# Patient Record
(demographics unavailable — no encounter records)

---

## 2024-10-08 NOTE — PROCEDURE
[FreeTextEntry3] : Date of Service: 10/08/2024   Account: [id number]   Patient: SAMI LAKHANI   YOB: 1960   Age: 63 year     Surgeon:                                               Joy Ellsworth M.D.   Pre-Operative Diagnosis:                   Lumbosacral Radiculitis (M54.17)       Post Operative Diagnosis:                 Lumbosacral Radiculitis (M54.17)       Procedure:                                           Interlaminar lumbar epidural steroid injection (L5-S1) under fluoroscopic guidance   Anesthesia:                None     This procedure was carried out using fluoroscopic guidance.  The risks and benefits of the procedure were discussed extensively with the patient.  The consent of the patient was obtained and the following procedure was performed.   The patient was placed in the prone position.  The lumbar area was prepped and draped in a sterile fashion.  Under AP view with slight cephalad-caudad angulation, the L5-S1 interspace was identified and marked.  Using sterile technique the superficial skin was anesthetized with 1% Lidocaine without epinephrine.  A 20 gauge Tuohoy needle was advanced into the epidural space under fluoroscopy using [id number] technique and using loss of resistance at the L5-S1 level.  After negative aspiration for heme or CSF, an epidurogram was obtained using 3 cc Omnipaque contrast confirming epidural placement of the needle.    Epidurogram showed no evidence of intrathecal or intravascular flow, and good evidence of bilateral epidural flow from L3-S2 levels.  After this, 5 cc of preservative free normal saline and 80 mg of Kenalog were injected into the epidural space.   The needle was subsequently removed.     The patient tolerated the procedure well and was instructed to contact me immediately if there were any problems.   Joy Ellsworth M.D.

## 2024-11-05 NOTE — ASSESSMENT
[FreeTextEntry1] : After discussing various treatment options with the patient including but not limited to oral medications, physical therapy, exercise, modalities as well as interventional spinal injections, we have decided with the following plan:  1) Intervention Injection Therapy: I personally reviewed the MRI/CT scan images and agree with the radiologist's report. The radiological findings were discussed with the patient. The risks, benefits, contents and alternatives to injection were explained in full to the patient. Risks outlined include but are not limited to infection,sepsis, bleeding, post-dural puncture headache, nerve damage, temporary increase in pain, syncopal episode, failure to resolve symptoms, allergic reaction, symptom recurrence, and elevation of blood sugar in diabetics. Cortisone may cause immunosuppression. Patient understands the risks. All questions were answered. After discussion of options, patient requested an injection. Information regarding the injection was given to the patient. Which medications to stop prior to the injection was explained to the patient as well.  Follow up in 1-2 weeks post injection for re-evaluation.  Continue Home exercises, stretching, activity modification, physical therapy, and conservative care.  Patient is presenting with acute/sub-acute radicular pain with impairment in ADLs and functionality.  The pain has not responded sufficiently to  conservative care including nsaid therapy and/or physical therapy.  There is no bleeding tendency, unstable medical condition, or systemic infection. The purpose of the spinal injections is to facilitate active therapy by providing short term relief through reduction of pain and inflammation.   Injections, by themselves, are not likely to provide long-term relief. Rather, active rehabilitation with modified work achieves long-term relief by increasing active ROM, strength and stability.   Will get new MRI

## 2024-11-05 NOTE — HISTORY OF PRESENT ILLNESS
[Lower back] : lower back [3] : 3 [0] : 0 [Dull/Aching] : dull/aching [Radiating] : radiating [Tightness] : tightness [Intermittent] : intermittent [Rest] : rest [Injection therapy] : injection therapy [FreeTextEntry1] : 11/05/2024: follow up today for LESI L5/S1 on 10/8 with 3% relief.  09/25/2024: follow up today for low back pain.  Would like to schedule LESI.  Not on ASA  6/20/24- fu for LESI L5/S1 on 6/6/24 with 60% relief so far. Pain intensity has improved. This is the most effective shot to date. Still with a dull ache in the lower back.   05/21/2024: follow up today for LESI L5/S1 on 5/7 with 20% cumulative relief from the last.  pain in the lower back without radiation.     01/08/2024: follow up today after LESI L5-S1 on 12/18/23 with 50% relief.  Hold off on repeat injection  12/07/2023: follow up today. He was doing well until a couple of weeks ago. Same pain as in the past. Pain is across the lower back.   06/22/2023- Had 80% relief from injection.  PP FU S/P L5-S1 LESI ON 06/12/23  80% relief   (11/16/22, 5/3/23) PT OF    MRI (2/4/22) [facility]: 1.  Multilevel degenerative disc disease of the lumbar spine, 2.  L1-L2: Mild central canal stenosis and mild narrowing of the right subarticular zone. Mild left neural foraminal stenosis. 3.  L2-L3: Mild central canal stenosis with impingement upon the descending right L3 nerve root. Mild left neural foraminal stenosis. 4.  L3-L4: Disc bulge/central disc extrusion causes mild to moderate central canal stenosis. Mild to moderate left neural foraminal stenosis. 5.  L4-L5: Mild bilateral neuroforaminal stenosis. [] : no [FreeTextEntry7] : across the back [de-identified] : ELDON

## 2024-11-22 NOTE — HISTORY OF PRESENT ILLNESS
[Lower back] : lower back [3] : 3 [0] : 0 [Dull/Aching] : dull/aching [Radiating] : radiating [Tightness] : tightness [Intermittent] : intermittent [Rest] : rest [Injection therapy] : injection therapy [FreeTextEntry1] : 11/22/2024: follow up today for MRI review. 11/11/24: [facility]:  multilevel DDD spinal canal stenosis at L3/4. unchanged. L3/4: extrusion with mild stenosis, mild facet arthrosis. L4/5: left greater than right NF narrowing. L5/S1 disc bulge in close proximity to the b/l S1 nerve roots.   Pain only in the axial back without radiation.   11/05/2024: follow up today for LESI L5/S1 on 10/8 with 3% relief.  09/25/2024: follow up today for low back pain.  Would like to schedule LESI.  Not on ASA  6/20/24- fu for LESI L5/S1 on 6/6/24 with 60% relief so far. Pain intensity has improved. This is the most effective shot to date. Still with a dull ache in the lower back.   05/21/2024: follow up today for LESI L5/S1 on 5/7 with 20% cumulative relief from the last.  pain in the lower back without radiation.     01/08/2024: follow up today after LESI L5-S1 on 12/18/23 with 50% relief.  Hold off on repeat injection  12/07/2023: follow up today. He was doing well until a couple of weeks ago. Same pain as in the past. Pain is across the lower back.   06/22/2023- Had 80% relief from injection.  PP FU S/P L5-S1 LESI ON 06/12/23  80% relief   (11/16/22, 5/3/23) PT OF    MRI (2/4/22) [facility]: 1.  Multilevel degenerative disc disease of the lumbar spine, 2.  L1-L2: Mild central canal stenosis and mild narrowing of the right subarticular zone. Mild left neural foraminal stenosis. 3.  L2-L3: Mild central canal stenosis with impingement upon the descending right L3 nerve root. Mild left neural foraminal stenosis. 4.  L3-L4: Disc bulge/central disc extrusion causes mild to moderate central canal stenosis. Mild to moderate left neural foraminal stenosis. 5.  L4-L5: Mild bilateral neuroforaminal stenosis. [] : no [FreeTextEntry7] : across the back [de-identified] : ELDON

## 2024-11-22 NOTE — ASSESSMENT

## 2024-11-22 NOTE — HISTORY OF PRESENT ILLNESS
[Lower back] : lower back [3] : 3 [0] : 0 [Dull/Aching] : dull/aching [Radiating] : radiating [Tightness] : tightness [Intermittent] : intermittent [Rest] : rest [Injection therapy] : injection therapy [FreeTextEntry1] : 11/22/2024: follow up today for MRI review. 11/11/24: [facility]:  multilevel DDD spinal canal stenosis at L3/4. unchanged. L3/4: extrusion with mild stenosis, mild facet arthrosis. L4/5: left greater than right NF narrowing. L5/S1 disc bulge in close proximity to the b/l S1 nerve roots.   Pain only in the axial back without radiation.   11/05/2024: follow up today for LESI L5/S1 on 10/8 with 3% relief.  09/25/2024: follow up today for low back pain.  Would like to schedule LESI.  Not on ASA  6/20/24- fu for LESI L5/S1 on 6/6/24 with 60% relief so far. Pain intensity has improved. This is the most effective shot to date. Still with a dull ache in the lower back.   05/21/2024: follow up today for LESI L5/S1 on 5/7 with 20% cumulative relief from the last.  pain in the lower back without radiation.     01/08/2024: follow up today after LESI L5-S1 on 12/18/23 with 50% relief.  Hold off on repeat injection  12/07/2023: follow up today. He was doing well until a couple of weeks ago. Same pain as in the past. Pain is across the lower back.   06/22/2023- Had 80% relief from injection.  PP FU S/P L5-S1 LESI ON 06/12/23  80% relief   (11/16/22, 5/3/23) PT OF    MRI (2/4/22) [facility]: 1.  Multilevel degenerative disc disease of the lumbar spine, 2.  L1-L2: Mild central canal stenosis and mild narrowing of the right subarticular zone. Mild left neural foraminal stenosis. 3.  L2-L3: Mild central canal stenosis with impingement upon the descending right L3 nerve root. Mild left neural foraminal stenosis. 4.  L3-L4: Disc bulge/central disc extrusion causes mild to moderate central canal stenosis. Mild to moderate left neural foraminal stenosis. 5.  L4-L5: Mild bilateral neuroforaminal stenosis. [] : no [FreeTextEntry7] : across the back [de-identified] : ELDON

## 2024-12-05 NOTE — IMAGING
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 No significant abnormalities are seen] : There are no fractures, subluxations or dislocations. No significant abnormalities are seen [de-identified] :   ----------------------------------------------------------------------------   Left elbow exam:   Inspection: +palpable small subluxating band or nodule over medial epicondyle, tender to touch    (neg) Carrying angle deformity    (neg) Swellling    (neg) Olecranon bursa    (neg) Jay ROM:   Flexion: 150    Extention: 0   Supination: 90      Pronation: 90 Tenderness:     (neg) Lateral epicondyle               (+) Medial epicondyle    (neg) LUCL                                   (neg) UCL    (neg) Radial head    (neg) Olecranon    (neg) Mid forearm    (neg) Radial tunnel       (neg) Biceps tendon / muscle        (neg) Palpable defect    (neg) Triceps tendon / muscle       (neg) Palpable defect Additional tests:    (neg) Pain with varus/valgus stress    (neg) Opening to varus/valgus stress    (neg) Milking test    (neg) Resisted wrist extension in elbow extension    (neg) Resisted wrist flexion in elbow extension Strength: 5/5 Flexion/Extension/Pronation/Supination Neuro: In tact to light touch throughout all distributions distally Vascularity: Extremity warm and well perfused  [FreeTextEntry1] : 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

## 2024-12-05 NOTE — HISTORY OF PRESENT ILLNESS
[Gradual] : gradual [8] : 8 [7] : 7 [Sharp] : sharp [Frequent] : frequent [Nothing helps with pain getting better] : Nothing helps with pain getting better [Lying in bed] : lying in bed [de-identified] : This is Mr. SAMI LAKHANI  a 62 year old male who comes in today complaining of bilateral shoulder pain for a while with no injury. right > left. pain worse in the am, improves throughout the day. no h/o shoulder injury.  retired - heavy machinery/ construction  PMH: No DM/ no glaucoma  [] : no [FreeTextEntry7] : to biceps [de-identified] : working out [de-identified] : none

## 2024-12-05 NOTE — DISCUSSION/SUMMARY
[de-identified] : csi L medial epicondyle  continue HEP  fu prn  ----------------------------------------------------------------------------   All relevant imaging studies pertinent to today's visit, including x-rays, MRI's and/or other advanced imaging studies (CT/etc) were independently interpreted and reviewed with the patient as needed. Implications of the studies together with the patient's clinical picture were discussed to formulate a working diagnosis and management options were detailed.   The patient and/or guardian was advised of the diagnosis. The natural history of the pathology was explained in full. All questions were answered. The risks and benefits of conservative and interventional treatment alternatives were explained to the patient  The patient and/or guardian was advised if any advanced diagnostic/imaging study (MRI/CT/etc) is ordered to evaluate potential pathology in the affected area(s), they should follow up in the office to review the results of the study and determine further management that may be indicated.    ----------------------------------------------------------------------------  Tendon origin corticosteroid injection given:  Patient indicated for injection after trial of rest, OTC medications including aspirin, Ibuprofen, Aleve etc or prescription NSAIDS, and/or exercises at home and/ or physical therapy without satisfactory response.  Patient has symptoms including pain, swelling, and/or decreased mobility in the joint. The risks, benefits, and alternatives to corticosteroid injection were explained in full to the patient, including but not limited to infection, sepsis, bleeding, scarring, skin discoloration, temporary increase in pain, syncopal episode, failure to resolve symptoms, allergic reaction, symptom recurrence, and elevation of blood sugar in diabetics. Patient understood the risks. All questions were answered. After discussion of options, patient requested an injection.   Oral informed consent was obtained and sterile technique was utilized for the procedure including the preparation of the solutions used for the injection and betadine followed by alcohol prep to the injection site. Anesthesia was given with ethyl chloride sprayed topically. The injection was delivered. Patient tolerated the procedure well.   Post Procedure Instructions: Patient was advised to call if redness, pain, or fever occur and apply ice for 15 min on and 15 min off later today  Medications delivered: Kenalog: 10mg, Lidocaine: 2cc

## 2024-12-05 NOTE — REASON FOR VISIT
[FreeTextEntry2] : follow up left elbow. had great response from csi to medial epicondyle from Aug. has been doing hep with success. pain returned about 2 wks ago and is now interfering with adls.

## 2024-12-12 NOTE — PROCEDURE
[FreeTextEntry3] : Date of Service: 12/12/2024   Account: [id number]   Patient: SAMI LAKHANI   YOB: 1960   Age: 63 year     Surgeon:                                      Joy Ellsworth M.D.   Assistant:                                    None.   Pre-Operative Diagnosis:            Spondylosis of Lumbar Region without Myelopathy or Radiculopathy (M47.816)      Post Operative Diagnosis:        Same    Procedure:                 Right L4-5, L5-S1 Facet block                                       Left L4-5, L5-S1 Facet block under fluoroscopic guidance    Anesthesia:                None     This procedure was carried out using fluoroscopic guidance.  The risks and benefits of the procedure were discussed extensively with the patient.  The consent of the patient was obtained and the following procedure was performed.   The patient was placed in the prone position.  The patient's back was prepped and draped in a sterile fashion.  The left L4 and L5 lumbar vertebral bodies were identified and the fluoroscope left obliqued to approximately 30 degrees to reveal good "Misbah-dog" anatomical view.  The junction of the superior articulate process and tranverse process at the L4 and L5 level was then identified and marked. The skin at these target points was then localized using 1 cc of 1% Lidocaine without epinephrine at each injection site.  A spinal needle was then introduced and advanced to the above target points at the junction of the SAP and transverse processes until oss was contacted.  After negative aspiration for heme and CSF, an injectate of 1cc 0.25% marcaine and 10mg of methylprednisolone acetate was injected at each of the two levels.   The right L4 and L5 lumbar vertebral bodies were identified and the fluoroscope right obliqued to approximately 30 degrees to reveal good "Misbah-dog" anatomical view.  The junction of the superior articulate process and tranverse process at the L4 and L5 level was then identified and marked. The skin at these target points was then localized using 1 cc of 1% Lidocaine without epinephrine at each injection site.  A spinal needle was then introduced and advanced to the above target points at the junction of the SAP and transverse processes until oss was contacted.  After negative aspiration for heme and CSF, an injectate of 1cc 0.25% marcaine and 10mg of methylprednisolone acetate was injected at each of the two levels.   Fluoroscope then focused on the bilateral sacral ala on AP view, and marked at these points.  The skin and subcutaneous structures were localized using 1cc of 1.0 % lidocaine without epinephrine.  A spinal needle was then advanced under fluoroscopic guidance until oss was contacted at the ala bilaterally.  After negative aspiration for heme and CSF, an injectate of 1cc 0.25% marcaine and 10mg of methylprednisolone acetate was injected at each site.   The needles were then removed and pressure was applied.  Vitals were monitored throughout.    Joy Ellsworth M.D.

## 2025-01-08 NOTE — HISTORY OF PRESENT ILLNESS
[FreeTextEntry1] : 01/08/2025: follow up today for B/L lumbar MBB with 80% relief  11/22/2024: follow up today for MRI review. 11/11/24: [facility]:  multilevel DDD spinal canal stenosis at L3/4. unchanged. L3/4: extrusion with mild stenosis, mild facet arthrosis. L4/5: left greater than right NF narrowing. L5/S1 disc bulge in close proximity to the b/l S1 nerve roots.   Pain only in the axial back without radiation.   11/05/2024: follow up today for LESI L5/S1 on 10/8 with 3% relief.  09/25/2024: follow up today for low back pain.  Would like to schedule LESI.  Not on ASA  6/20/24- fu for LESI L5/S1 on 6/6/24 with 60% relief so far. Pain intensity has improved. This is the most effective shot to date. Still with a dull ache in the lower back.   05/21/2024: follow up today for LESI L5/S1 on 5/7 with 20% cumulative relief from the last.  pain in the lower back without radiation.     01/08/2024: follow up today after LESI L5-S1 on 12/18/23 with 50% relief.  Hold off on repeat injection  12/07/2023: follow up today. He was doing well until a couple of weeks ago. Same pain as in the past. Pain is across the lower back.   06/22/2023- Had 80% relief from injection.  PP FU S/P L5-S1 LESI ON 06/12/23  80% relief   (11/16/22, 5/3/23) PT OF    MRI (2/4/22) [facility]: 1.  Multilevel degenerative disc disease of the lumbar spine, 2.  L1-L2: Mild central canal stenosis and mild narrowing of the right subarticular zone. Mild left neural foraminal stenosis. 3.  L2-L3: Mild central canal stenosis with impingement upon the descending right L3 nerve root. Mild left neural foraminal stenosis. 4.  L3-L4: Disc bulge/central disc extrusion causes mild to moderate central canal stenosis. Mild to moderate left neural foraminal stenosis. 5.  L4-L5: Mild bilateral neuroforaminal stenosis. [Lower back] : lower back [3] : 3 [0] : 0 [Dull/Aching] : dull/aching [Radiating] : radiating [Tightness] : tightness [Intermittent] : intermittent [Rest] : rest [Injection therapy] : injection therapy [] : no [FreeTextEntry7] : across the back [de-identified] : ELDON

## 2025-01-08 NOTE — ASSESSMENT

## 2025-01-16 NOTE — IMAGING
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 No significant abnormalities are seen] : There are no fractures, subluxations or dislocations. No significant abnormalities are seen [de-identified] :   ----------------------------------------------------------------------------   Bilateral shoulder exam:   Inspection: no obvious deformity, no obvious masses, no swelling, no effusion, no atrophy ROM:    FF: 90R 140L    ER: 10R 30L    IR: psis R t12 L Tenderness:    (+R>L) Anterior/Biceps:    (neg) Posterior    (neg) Lateral    (neg) Trapezius    (neg) Scapula    (+) AC joint    (+) Crepitus with ROM Stability:    (neg) Translation    (neg) Apprehension    (neg) Clicking Additional tests:    (+) Neer's    (+)Hawkin's    (neg) Pires's    (neg) Speed    (neg) Cross chest adduction Strength: pain w testing    FF: 5-/5 R 5/5 L    ER: 5/5    IR: 5/5    Biceps: 5/5    Triceps: 5/5    Distal: 5/5 Neuro: In tact to light touch throughout Vascularity: Extremity warm and well perfused   [FreeTextEntry1] : 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

## 2025-01-16 NOTE — HISTORY OF PRESENT ILLNESS
[Gradual] : gradual [8] : 8 [7] : 7 [Sharp] : sharp [Frequent] : frequent [Sleep] : sleep [Nothing helps with pain getting better] : Nothing helps with pain getting better [Lying in bed] : lying in bed [de-identified] : F/U bilateral shoulder pain. Pain has increased since last visit. Reacted very well to CSI from 12/05/24 and would like to discuss more.  [] : no [FreeTextEntry7] : to biceps [de-identified] : working out [de-identified] : none

## 2025-01-16 NOTE — DISCUSSION/SUMMARY
[de-identified] : Discussed tx options including PT/HEP, CSI inj, HA inj, TSA/ RTSA IAC today BL shoulders cont HEP fu prn  ----------------------------------------------------------------------------   All relevant imaging studies pertinent to today's visit, including x-rays, MRI's and/or other advanced imaging studies (CT/etc) were independently interpreted and reviewed with the patient as needed. Implications of the studies together with the patient's clinical picture were discussed to formulate a working diagnosis and management options were detailed.   The patient and/or guardian was advised of the diagnosis. The natural history of the pathology was explained in full. All questions were answered. The risks and benefits of conservative and interventional treatment alternatives were explained to the patient  The patient and/or guardian was advised if any advanced diagnostic/imaging study (MRI/CT/etc) is ordered to evaluate potential pathology in the affected area(s), they should follow up in the office to review the results of the study and determine further management that may be indicated.   ------------------------------------------------------------------  Large joint corticosteroid injection given: Bilateral shoulder intraarticular  Patient indicated for injection after trial of rest, OTC medications including aspirin, Ibuprofen, Aleve etc or prescription NSAIDS, and/or exercises at home and/ or physical therapy without satisfactory response. Patient has symptoms including pain, swelling, and/or decreased mobility in the joint. The risks, benefits, and alternatives to corticosteroid injection were explained in full to the patient, including but not limited to infection, sepsis, bleeding, scarring, skin discoloration, temporary increase in pain, syncopal episode, failure to resolve symptoms, allergic reaction, symptom recurrence, and elevation of blood sugar in diabetics. Patient understood the risks. All questions were answered. After discussion of options, patient requested an injection.   Oral informed consent was obtained and sterile technique was utilized for the procedure including the preparation of the solutions used for the injection and betadine followed by alcohol prep to the injection site. Anesthesia was given with ethyl chloride sprayed topically. The injection was delivered. Patient tolerated the procedure well.   Post Procedure Instructions: Patient was advised to call if redness, pain, or fever occur and apply ice for 15 min on and 15 min off later today  Medications delivered: Kenalog 10 mg, Lidocaine: 4 cc per joint    Progress note completed by Darrin Mirza PA-C working as a scribe for Dr Chowdhury

## 2025-01-16 NOTE — REASON FOR VISIT
[FreeTextEntry2] : follow up bilateral shoulders. R>L. CSI from Sept wore off about 2 wks ago. progressively worsening since rthen. + nighttime awkening

## 2025-01-21 NOTE — PROCEDURE
[FreeTextEntry3] : Date of Service: 01/21/2025   Account: [id number]   Patient: SAMI LAKHANI   YOB: 1960   Age: 64 year     Surgeon:                                      Joy Ellsworth M.D.   Assistant:                                    None.   Pre-Operative Diagnosis:            Spondylosis of Lumbar Region without Myelopathy or Radiculopathy (M47.816)      Post Operative Diagnosis:        Same    Procedure:                 Right L4-5, L5-S1 Facet block                                       Left L4-5, L5-S1 Facet block under fluoroscopic guidance    Anesthesia:                None     This procedure was carried out using fluoroscopic guidance.  The risks and benefits of the procedure were discussed extensively with the patient.  The consent of the patient was obtained and the following procedure was performed.   The patient was placed in the prone position.  The patient's back was prepped and draped in a sterile fashion.  The left L4 and L5 lumbar vertebral bodies were identified and the fluoroscope left obliqued to approximately 30 degrees to reveal good "Misbah-dog" anatomical view.  The junction of the superior articulate process and tranverse process at the L4 and L5 level was then identified and marked. The skin at these target points was then localized using 1 cc of 1% Lidocaine without epinephrine at each injection site.  A spinal needle was then introduced and advanced to the above target points at the junction of the SAP and transverse processes until oss was contacted.  After negative aspiration for heme and CSF, an injectate of 1cc 0.25% marcaine and 10mg of methylprednisolone acetate was injected at each of the two levels.   The right L4 and L5 lumbar vertebral bodies were identified and the fluoroscope right obliqued to approximately 30 degrees to reveal good "Misbah-dog" anatomical view.  The junction of the superior articulate process and tranverse process at the L4 and L5 level was then identified and marked. The skin at these target points was then localized using 1 cc of 1% Lidocaine without epinephrine at each injection site.  A spinal needle was then introduced and advanced to the above target points at the junction of the SAP and transverse processes until oss was contacted.  After negative aspiration for heme and CSF, an injectate of 1cc 0.25% marcaine and 10mg of methylprednisolone acetate was injected at each of the two levels.   Fluoroscope then focused on the bilateral sacral ala on AP view, and marked at these points.  The skin and subcutaneous structures were localized using 1cc of 1.0 % lidocaine without epinephrine.  A spinal needle was then advanced under fluoroscopic guidance until oss was contacted at the ala bilaterally.  After negative aspiration for heme and CSF, an injectate of 1cc 0.25% marcaine and 10mg of methylprednisolone acetate was injected at each site.   The needles were then removed and pressure was applied.  Vitals were monitored throughout.    Joy Ellsworth M.D. Detail Level: Zone Initiate Treatment: Triamcinolone acetonide 0.1 % cream apply a thin layer to affected areas,  twice daily for 2 weeks until clear. After, may use as needed for flares Render In Strict Bullet Format?: No

## 2025-02-13 NOTE — HISTORY OF PRESENT ILLNESS
[Lower back] : lower back [3] : 3 [0] : 0 [Dull/Aching] : dull/aching [Radiating] : radiating [Tightness] : tightness [Intermittent] : intermittent [Rest] : rest [Injection therapy] : injection therapy [FreeTextEntry1] : 02/13/2025: follow up today for B/L L4-5, L5-S1 MBB on 1/21/25 with 90% relief. Pain is starting to recur.  Pain only in the axial lower back.   01/08/2025: follow up today for B/L lumbar MBB with 80% relief  11/22/2024: follow up today for MRI review. 11/11/24: LHR:  multilevel DDD spinal canal stenosis at L3/4. unchanged. L3/4: extrusion with mild stenosis, mild facet arthrosis. L4/5: left greater than right NF narrowing. L5/S1 disc bulge in close proximity to the b/l S1 nerve roots.   Pain only in the axial back without radiation.   11/05/2024: follow up today for LESI L5/S1 on 10/8 with 3% relief.  09/25/2024: follow up today for low back pain.  Would like to schedule LESI.  Not on ASA  6/20/24- fu for LESI L5/S1 on 6/6/24 with 60% relief so far. Pain intensity has improved. This is the most effective shot to date. Still with a dull ache in the lower back.   05/21/2024: follow up today for LESI L5/S1 on 5/7 with 20% cumulative relief from the last.  pain in the lower back without radiation.     01/08/2024: follow up today after LESI L5-S1 on 12/18/23 with 50% relief.  Hold off on repeat injection  12/07/2023: follow up today. He was doing well until a couple of weeks ago. Same pain as in the past. Pain is across the lower back.   06/22/2023- Had 80% relief from injection.  PP FU S/P L5-S1 LESI ON 06/12/23  80% relief   (11/16/22, 5/3/23) PT OF    MRI (2/4/22) LHR: 1.  Multilevel degenerative disc disease of the lumbar spine, 2.  L1-L2: Mild central canal stenosis and mild narrowing of the right subarticular zone. Mild left neural foraminal stenosis. 3.  L2-L3: Mild central canal stenosis with impingement upon the descending right L3 nerve root. Mild left neural foraminal stenosis. 4.  L3-L4: Disc bulge/central disc extrusion causes mild to moderate central canal stenosis. Mild to moderate left neural foraminal stenosis. 5.  L4-L5: Mild bilateral neuroforaminal stenosis. [] : no [FreeTextEntry7] : across the back [de-identified] : ELDON

## 2025-02-13 NOTE — HISTORY OF PRESENT ILLNESS
[Lower back] : lower back [3] : 3 [0] : 0 [Dull/Aching] : dull/aching [Radiating] : radiating [Tightness] : tightness [Intermittent] : intermittent [Rest] : rest [Injection therapy] : injection therapy [FreeTextEntry1] : 02/13/2025: follow up today for B/L L4-5, L5-S1 MBB on 1/21/25 with 90% relief. Pain is starting to recur.  Pain only in the axial lower back.   01/08/2025: follow up today for B/L lumbar MBB with 80% relief  11/22/2024: follow up today for MRI review. 11/11/24: LHR:  multilevel DDD spinal canal stenosis at L3/4. unchanged. L3/4: extrusion with mild stenosis, mild facet arthrosis. L4/5: left greater than right NF narrowing. L5/S1 disc bulge in close proximity to the b/l S1 nerve roots.   Pain only in the axial back without radiation.   11/05/2024: follow up today for LESI L5/S1 on 10/8 with 3% relief.  09/25/2024: follow up today for low back pain.  Would like to schedule LESI.  Not on ASA  6/20/24- fu for LESI L5/S1 on 6/6/24 with 60% relief so far. Pain intensity has improved. This is the most effective shot to date. Still with a dull ache in the lower back.   05/21/2024: follow up today for LESI L5/S1 on 5/7 with 20% cumulative relief from the last.  pain in the lower back without radiation.     01/08/2024: follow up today after LESI L5-S1 on 12/18/23 with 50% relief.  Hold off on repeat injection  12/07/2023: follow up today. He was doing well until a couple of weeks ago. Same pain as in the past. Pain is across the lower back.   06/22/2023- Had 80% relief from injection.  PP FU S/P L5-S1 LESI ON 06/12/23  80% relief   (11/16/22, 5/3/23) PT OF    MRI (2/4/22) LHR: 1.  Multilevel degenerative disc disease of the lumbar spine, 2.  L1-L2: Mild central canal stenosis and mild narrowing of the right subarticular zone. Mild left neural foraminal stenosis. 3.  L2-L3: Mild central canal stenosis with impingement upon the descending right L3 nerve root. Mild left neural foraminal stenosis. 4.  L3-L4: Disc bulge/central disc extrusion causes mild to moderate central canal stenosis. Mild to moderate left neural foraminal stenosis. 5.  L4-L5: Mild bilateral neuroforaminal stenosis. [] : no [FreeTextEntry7] : across the back [de-identified] : ELDON

## 2025-02-13 NOTE — ASSESSMENT

## 2025-03-03 NOTE — PROCEDURE
[FreeTextEntry3] : Date of Service: 03/03/2025   Account: [id number]   Patient: SAMI LAKHANI   YOB: 1960   Age: 64 year     PREOPERATIVE DIAGNOSIS: Spondylosis of Lumbar Region without Myelopathy or Radiculopathy (M47.816)       1.           POSTOPERATIVE DIAGNOSIS: Spondylosis of Lumbar Region without Myelopathy or Radiculopathy (M47.816)       1.           PROCEDURE:           1) Right L3, L4, L5 and Left L3, L4, L5 medial branch radiofrequency ablation under fluoroscopic guidance.                         Anesthesia:                                                      None     Risks, benefits and alternatives of the procedure were discussed with the patient after which they agreed to proceed.  Patient was brought into fluoroscopy suite and was placed in prone position with hip support. Back was prepped and draped in a sterile fashion.   Under AP visualization, the right and left sacral ala was identified and marked. Using a 25 gauge  inch needle the skin and subcutaneous structures at this point were localized with 1% Lidocaine using approximately 3 cc's 1% Lidocaine.  After this, a 20 gauge 100mm Lloyd radiofrequency needle with a 10mm curved tip was inserted and using depth direction depth technique under constant fluoroscopic visualization, the needle was advanced to the sacral ala until os was contacted.   The camera was then redirected under AP view to visualize the right and left L4 and L5 vertebra. The camera was obliqued to approximately 30 degrees to reveal good Misbah dog anatomical view. The junction of the superior articulate process and transverse process at the L4 and L5 levels  were then identified and marked. Skin and subcutaneous structures were then anesthetized with approximately 3 cc's of 1% Lidocaine at each of these levels. After which a 20 gauge 100mm [place] radiofrequency needle with a 10mm curved tip then advanced until the junction at the SAP and transverse process was met.  The camera was then directed through the lateral view and under constant fluoroscopic visualization, the needle tips were then advanced and confirmed at the junction of the SAP and transverse process.   The stylette for the most cephalad needle at the L4 level was then removed and a [place] radiofrequency probe was then placed inside the needle. After their pedis' were checked at approximately 300 ohm, 50 hertz sensory stimulation was performed.  Patient experienced concordant pain in their low back at approximately 0.3 volts. The voltage was then increased to 1 volt. The patient reported increased low back pain symptoms without any radiation below the knee.  Stimulation was then changed to 2 hertz and increased slowly by .1 volt increments at approximately 0.5 volts, patient began to experience thumping like reproductive pain in their low back. The voltage was then increased to approximately 2.5 volts. Patient experienced increasing thumping without any sensation below their knee. This exact stimulation was then repeated for the L5 needle as well as sacral ala needle with concordant pain and no radiation below the knee. The 6 levels were then anesthetized with approximately 0.5 cc's of 1% Lidocaine. After which each area was then ablated at 80 degrees centigrade for 90 seconds each. Patient felt no pain reproduction during the ablation procedure.  After each of these levels were ablated and injected approximately 1 cc of 0.25% Bupivacaine plus 80 mg of Kenalog were then injected before the needles were removed.  Pressure was then applied to the low back. Band-aids were applied.  Patient was brought to the recovery, ambulated on their own after the procedure and reported decreased low back pain.    Patient was told to apply ice to the low back for 20 minutes on and 20 minutes off for focal symptoms for 24-48 hours. They should call the office if they have any questions or concerns.   Joy Ellsworth M.D.

## 2025-03-06 NOTE — DISCUSSION/SUMMARY
[Medication Risks Reviewed] : Medication risks reviewed [de-identified] : We discussed we would limit any further injs to the L side  We discussed surgery and PRP Plan for L elbow  medial epicondyle  CSI inj today   Plan for R elbow lateral epicondyle CSI inj today  ----------------------------------------------------------------------------   All relevant imaging studies pertinent to today's visit, including x-rays, MRI's and/or other advanced imaging studies (CT/etc) were independently interpreted and reviewed with the patient as needed. Implications of the studies together with the patient's clinical picture were discussed to formulate a working diagnosis and management options were detailed.   The patient and/or guardian was advised of the diagnosis. The natural history of the pathology was explained in full. All questions were answered. The risks and benefits of conservative and interventional treatment alternatives were explained to the patient  The patient and/or guardian was advised if any advanced diagnostic/imaging study (MRI/CT/etc) is ordered to evaluate potential pathology in the affected area(s), they should follow up in the office to review the results of the study and determine further management that may be indicated.   ------------------------------------------------------------------  Tendon origin corticosteroid injection given: left elbow medial epicondye  Patient indicated for injection after trial of rest, OTC medications including aspirin, Ibuprofen, Aleve etc or prescription NSAIDS, and/or exercises at home and/ or physical therapy without satisfactory response.  Patient has symptoms including pain, swelling, and/or decreased mobility in the joint. The risks, benefits, and alternatives to corticosteroid injection were explained in full to the patient, including but not limited to infection, sepsis, bleeding, scarring, skin discoloration, temporary increase in pain, syncopal episode, failure to resolve symptoms, allergic reaction, symptom recurrence, and elevation of blood sugar in diabetics. Patient understood the risks. All questions were answered. After discussion of options, patient requested an injection.   Oral informed consent was obtained and sterile technique was utilized for the procedure including the preparation of the solutions used for the injection and betadine followed by alcohol prep to the injection site. Anesthesia was given with ethyl chloride sprayed topically. The injection was delivered. Patient tolerated the procedure well.   Post Procedure Instructions: Patient was advised to call if redness, pain, or fever occur and apply ice for 15 min on and 15 min off later today  Medications delivered: Kenalog: 10mg, Lidocaine: 2cc   ----------------------------------------------------------------------------   Tendon origin corticosteroid injection given: right elbow lateral epicondye  Patient indicated for injection after trial of rest, OTC medications including aspirin, Ibuprofen, Aleve etc or prescription NSAIDS, and/or exercises at home and/ or physical therapy without satisfactory response.  Patient has symptoms including pain, swelling, and/or decreased mobility in the joint. The risks, benefits, and alternatives to corticosteroid injection were explained in full to the patient, including but not limited to infection, sepsis, bleeding, scarring, skin discoloration, temporary increase in pain, syncopal episode, failure to resolve symptoms, allergic reaction, symptom recurrence, and elevation of blood sugar in diabetics. Patient understood the risks. All questions were answered. After discussion of options, patient requested an injection.   Oral informed consent was obtained and sterile technique was utilized for the procedure including the preparation of the solutions used for the injection and betadine followed by alcohol prep to the injection site. Anesthesia was given with ethyl chloride sprayed topically. The injection was delivered. Patient tolerated the procedure well.   Post Procedure Instructions: Patient was advised to call if redness, pain, or fever occur and apply ice for 15 min on and 15 min off later today  Medications delivered: Kenalog: 10mg, Lidocaine: 2cc

## 2025-03-06 NOTE — IMAGING
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 No significant abnormalities are seen] : There are no fractures, subluxations or dislocations. No significant abnormalities are seen [de-identified] :   ----------------------------------------------------------------------------   Bilateral shoulder exam:   Inspection: no obvious deformity, no obvious masses, no swelling, no effusion, no atrophy ROM:    FF: 90R 140L    ER: 10R 30L    IR: psis R t12 L Tenderness:    (+R>L) Anterior/Biceps:    (neg) Posterior    (neg) Lateral    (neg) Trapezius    (neg) Scapula    (+) AC joint    (+) Crepitus with ROM Stability:    (neg) Translation    (neg) Apprehension    (neg) Clicking Additional tests:    (+) Neer's    (+)Hawkin's    (neg) Pires's    (neg) Speed    (neg) Cross chest adduction Strength: pain w testing    FF: 5-/5 R 5/5 L    ER: 5/5    IR: 5/5    Biceps: 5/5    Triceps: 5/5    Distal: 5/5 Neuro: In tact to light touch throughout Vascularity: Extremity warm and well perfused      ----------------------------------------------------------------------------     Bilateral elbow exam:    Inspection:    (neg) Carrying angle deformity    (neg) Swelling    (neg) Olecranon bursa    (neg) Jay ROM:   Flexion: 150    Extention: 0   Supination: 90      Pronation: 90 R elbow 5-130   Tenderness:    (+R) Lateral epicondyle               (+L) Medial epicondyle    (neg) LUCL                                   (neg) UCL    (neg) Radial head    (neg) Olecranon    (neg) Mid forearm    (neg) Radial tunnel      (neg) Biceps tendon / muscle        (neg) Palpable defect    (neg) Triceps tendon / muscle       (neg) Palpable defect Additional tests:    (neg) Pain with varus/valgus stress    (neg) Opening to varus/valgus stress    (neg) Milking test    (neg) Tinel's cubital tunnel              (neg) Subluxing ulnar nerve    (+) Resisted wrist extension in elbow extension    (+) Resisted wrist flexion in elbow extension Strength: 5/5 Flexion/Extension/Pronation/Supination Neuro: In tact to light touch throughout all distributions distally Vascularity: Extremity warm and well perfused    [FreeTextEntry1] : 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

## 2025-03-06 NOTE — HISTORY OF PRESENT ILLNESS
[Gradual] : gradual [8] : 8 [2] : 2 [Sharp] : sharp [Frequent] : frequent [Sleep] : sleep [Ice] : ice [Lying in bed] : lying in bed [de-identified] : This is Mr. SAMI LAKHANI  a 64 year old male who comes in today complaining of bilateral elbow pain.  His right elbow is a new injury which has been going on for 4-6 weeks.  no injury  [] : no [FreeTextEntry7] : to biceps [de-identified] : working out [de-identified] : none

## 2025-04-14 NOTE — HISTORY OF PRESENT ILLNESS
[Lower back] : lower back [3] : 3 [0] : 0 [Dull/Aching] : dull/aching [Radiating] : radiating [Tightness] : tightness [Intermittent] : intermittent [Rest] : rest [Injection therapy] : injection therapy [2] : 2 [FreeTextEntry1] : 4/14/25- fu for B/L lumbar RFA on 3/3/25 with 90% relief.  Overall much improved.    02/13/2025: follow up today for B/L L4-5, L5-S1 MBB on 1/21/25 with 90% relief. Pain is starting to recur.  Pain only in the axial lower back.   01/08/2025: follow up today for B/L lumbar MBB with 80% relief  11/22/2024: follow up today for MRI review. 11/11/24: LHR:  multilevel DDD spinal canal stenosis at L3/4. unchanged. L3/4: extrusion with mild stenosis, mild facet arthrosis. L4/5: left greater than right NF narrowing. L5/S1 disc bulge in close proximity to the b/l S1 nerve roots.   Pain only in the axial back without radiation.   11/05/2024: follow up today for LESI L5/S1 on 10/8 with 3% relief.  09/25/2024: follow up today for low back pain.  Would like to schedule LESI.  Not on ASA  6/20/24- fu for LESI L5/S1 on 6/6/24 with 60% relief so far. Pain intensity has improved. This is the most effective shot to date. Still with a dull ache in the lower back.   05/21/2024: follow up today for LESI L5/S1 on 5/7 with 20% cumulative relief from the last.  pain in the lower back without radiation.     01/08/2024: follow up today after LESI L5-S1 on 12/18/23 with 50% relief.  Hold off on repeat injection  12/07/2023: follow up today. He was doing well until a couple of weeks ago. Same pain as in the past. Pain is across the lower back.   06/22/2023- Had 80% relief from injection.  PP FU S/P L5-S1 LESI ON 06/12/23  80% relief   (11/16/22, 5/3/23) PT OF    MRI (2/4/22) LHR: 1.  Multilevel degenerative disc disease of the lumbar spine, 2.  L1-L2: Mild central canal stenosis and mild narrowing of the right subarticular zone. Mild left neural foraminal stenosis. 3.  L2-L3: Mild central canal stenosis with impingement upon the descending right L3 nerve root. Mild left neural foraminal stenosis. 4.  L3-L4: Disc bulge/central disc extrusion causes mild to moderate central canal stenosis. Mild to moderate left neural foraminal stenosis. 5.  L4-L5: Mild bilateral neuroforaminal stenosis. [] : no [FreeTextEntry7] : across the back [de-identified] : ELDON

## 2025-04-14 NOTE — HISTORY OF PRESENT ILLNESS
[Lower back] : lower back [3] : 3 [0] : 0 [Dull/Aching] : dull/aching [Radiating] : radiating [Tightness] : tightness [Intermittent] : intermittent [Rest] : rest [Injection therapy] : injection therapy [2] : 2 [FreeTextEntry1] : 4/14/25- fu for B/L lumbar RFA on 3/3/25 with 90% relief.  Overall much improved.    02/13/2025: follow up today for B/L L4-5, L5-S1 MBB on 1/21/25 with 90% relief. Pain is starting to recur.  Pain only in the axial lower back.   01/08/2025: follow up today for B/L lumbar MBB with 80% relief  11/22/2024: follow up today for MRI review. 11/11/24: LHR:  multilevel DDD spinal canal stenosis at L3/4. unchanged. L3/4: extrusion with mild stenosis, mild facet arthrosis. L4/5: left greater than right NF narrowing. L5/S1 disc bulge in close proximity to the b/l S1 nerve roots.   Pain only in the axial back without radiation.   11/05/2024: follow up today for LESI L5/S1 on 10/8 with 3% relief.  09/25/2024: follow up today for low back pain.  Would like to schedule LESI.  Not on ASA  6/20/24- fu for LESI L5/S1 on 6/6/24 with 60% relief so far. Pain intensity has improved. This is the most effective shot to date. Still with a dull ache in the lower back.   05/21/2024: follow up today for LESI L5/S1 on 5/7 with 20% cumulative relief from the last.  pain in the lower back without radiation.     01/08/2024: follow up today after LESI L5-S1 on 12/18/23 with 50% relief.  Hold off on repeat injection  12/07/2023: follow up today. He was doing well until a couple of weeks ago. Same pain as in the past. Pain is across the lower back.   06/22/2023- Had 80% relief from injection.  PP FU S/P L5-S1 LESI ON 06/12/23  80% relief   (11/16/22, 5/3/23) PT OF    MRI (2/4/22) LHR: 1.  Multilevel degenerative disc disease of the lumbar spine, 2.  L1-L2: Mild central canal stenosis and mild narrowing of the right subarticular zone. Mild left neural foraminal stenosis. 3.  L2-L3: Mild central canal stenosis with impingement upon the descending right L3 nerve root. Mild left neural foraminal stenosis. 4.  L3-L4: Disc bulge/central disc extrusion causes mild to moderate central canal stenosis. Mild to moderate left neural foraminal stenosis. 5.  L4-L5: Mild bilateral neuroforaminal stenosis. [] : no [FreeTextEntry7] : across the back [de-identified] : ELDON

## 2025-04-14 NOTE — ASSESSMENT

## 2025-04-14 NOTE — HISTORY OF PRESENT ILLNESS
[Lower back] : lower back [3] : 3 [0] : 0 [Dull/Aching] : dull/aching [Radiating] : radiating [Tightness] : tightness [Intermittent] : intermittent [Rest] : rest [Injection therapy] : injection therapy [2] : 2 [FreeTextEntry1] : 4/14/25- fu for B/L lumbar RFA on 3/3/25 with 90% relief.  Overall much improved.    02/13/2025: follow up today for B/L L4-5, L5-S1 MBB on 1/21/25 with 90% relief. Pain is starting to recur.  Pain only in the axial lower back.   01/08/2025: follow up today for B/L lumbar MBB with 80% relief  11/22/2024: follow up today for MRI review. 11/11/24: LHR:  multilevel DDD spinal canal stenosis at L3/4. unchanged. L3/4: extrusion with mild stenosis, mild facet arthrosis. L4/5: left greater than right NF narrowing. L5/S1 disc bulge in close proximity to the b/l S1 nerve roots.   Pain only in the axial back without radiation.   11/05/2024: follow up today for LESI L5/S1 on 10/8 with 3% relief.  09/25/2024: follow up today for low back pain.  Would like to schedule LESI.  Not on ASA  6/20/24- fu for LESI L5/S1 on 6/6/24 with 60% relief so far. Pain intensity has improved. This is the most effective shot to date. Still with a dull ache in the lower back.   05/21/2024: follow up today for LESI L5/S1 on 5/7 with 20% cumulative relief from the last.  pain in the lower back without radiation.     01/08/2024: follow up today after LESI L5-S1 on 12/18/23 with 50% relief.  Hold off on repeat injection  12/07/2023: follow up today. He was doing well until a couple of weeks ago. Same pain as in the past. Pain is across the lower back.   06/22/2023- Had 80% relief from injection.  PP FU S/P L5-S1 LESI ON 06/12/23  80% relief   (11/16/22, 5/3/23) PT OF    MRI (2/4/22) LHR: 1.  Multilevel degenerative disc disease of the lumbar spine, 2.  L1-L2: Mild central canal stenosis and mild narrowing of the right subarticular zone. Mild left neural foraminal stenosis. 3.  L2-L3: Mild central canal stenosis with impingement upon the descending right L3 nerve root. Mild left neural foraminal stenosis. 4.  L3-L4: Disc bulge/central disc extrusion causes mild to moderate central canal stenosis. Mild to moderate left neural foraminal stenosis. 5.  L4-L5: Mild bilateral neuroforaminal stenosis. [] : no [FreeTextEntry7] : across the back [de-identified] : ELDON